# Patient Record
Sex: FEMALE | Race: WHITE | NOT HISPANIC OR LATINO | URBAN - METROPOLITAN AREA
[De-identification: names, ages, dates, MRNs, and addresses within clinical notes are randomized per-mention and may not be internally consistent; named-entity substitution may affect disease eponyms.]

---

## 2017-02-08 ENCOUNTER — OFFICE VISIT (OUTPATIENT)
Dept: RETAIL CLINIC | Facility: CLINIC | Age: 22
End: 2017-02-08

## 2017-02-08 DIAGNOSIS — Z11.1 VISIT FOR TB SKIN TEST: Primary | ICD-10-CM

## 2017-02-08 PROCEDURE — 86580 TB INTRADERMAL TEST: CPT | Performed by: NURSE PRACTITIONER

## 2017-02-08 NOTE — PROGRESS NOTES
CC:Mer Collins presents for Tb screening and ppd.    S: Mer Collins reports she  Has never had a positive test for Tb or been infected with Tb.  Denies symptoms of active Tb and risk factors for acquiring latent or active Tb:  Has not had a cough> 3 weeks, hemoptysis, unexplained fever, unexplained weight loss, fatigue, night sweats, or change in appetite.  s not a high risk contact of person known or suspected of having Tb.  Has not been to another country for 3 or more months where Tb is common.  Has been in the [place] for > 5 years  Is not a resident or employee of high Tb risk congregate setting.  Is not a health care worker who serves high-risk patients.  Is not medically underserved.  Has not been homeless in past 2 years.  Does not inject illicit drugs or use crack cocaine.  Is not HIV positive, or considered at risk for HIV if status is unknown.   Is not imunosuppressed or on immunosuppressive therapy.  Is not malnourished or >10% below ideal body weight.    O: Appears well today. Respirations are even & unlabored. Lungs are CTA bilaterally.      A: Tb Skin Test    P:TST placed.  Return to clinic in 48-72 hours for evaluation of Tb skin test. Advised not to scratch or cover with bandaid.    CESAR Dalton

## 2017-02-10 LAB
INDURATION: 0 MM (ref 0–10)
TB SKIN TEST: NEGATIVE